# Patient Record
Sex: MALE | Race: BLACK OR AFRICAN AMERICAN | Employment: FULL TIME | URBAN - METROPOLITAN AREA
[De-identification: names, ages, dates, MRNs, and addresses within clinical notes are randomized per-mention and may not be internally consistent; named-entity substitution may affect disease eponyms.]

---

## 2018-04-09 ENCOUNTER — OFFICE VISIT (OUTPATIENT)
Dept: FAMILY MEDICINE CLINIC | Age: 53
End: 2018-04-09

## 2018-04-09 ENCOUNTER — HOSPITAL ENCOUNTER (OUTPATIENT)
Dept: LAB | Age: 53
Discharge: HOME | End: 2018-04-09
Payer: COMMERCIAL

## 2018-04-09 VITALS
OXYGEN SATURATION: 98 % | SYSTOLIC BLOOD PRESSURE: 142 MMHG | BODY MASS INDEX: 34.8 KG/M2 | TEMPERATURE: 97.4 F | WEIGHT: 235 LBS | DIASTOLIC BLOOD PRESSURE: 102 MMHG | HEIGHT: 69 IN | RESPIRATION RATE: 16 BRPM | HEART RATE: 58 BPM

## 2018-04-09 DIAGNOSIS — Z00.00 ROUTINE GENERAL MEDICAL EXAMINATION AT A HEALTH CARE FACILITY: ICD-10-CM

## 2018-04-09 DIAGNOSIS — Z00.00 ROUTINE GENERAL MEDICAL EXAMINATION AT A HEALTH CARE FACILITY: Primary | ICD-10-CM

## 2018-04-09 DIAGNOSIS — E55.9 VITAMIN D DEFICIENCY: ICD-10-CM

## 2018-04-09 DIAGNOSIS — I10 ESSENTIAL HYPERTENSION: ICD-10-CM

## 2018-04-09 DIAGNOSIS — E66.9 OBESITY (BMI 30.0-34.9): ICD-10-CM

## 2018-04-09 DIAGNOSIS — Z13.31 SCREENING FOR DEPRESSION: ICD-10-CM

## 2018-04-09 LAB
ALBUMIN SERPL-MCNC: 4.1 G/DL (ref 3.4–5)
ALBUMIN/GLOB SERPL: 1 {RATIO} (ref 0.8–1.7)
ALP SERPL-CCNC: 92 U/L (ref 45–117)
ALT SERPL-CCNC: 34 U/L (ref 16–61)
ANION GAP SERPL CALC-SCNC: 8 MMOL/L (ref 3–18)
AST SERPL-CCNC: 29 U/L (ref 15–37)
BILIRUB SERPL-MCNC: 1.1 MG/DL (ref 0.2–1)
BILIRUB UR QL STRIP: NEGATIVE
BUN SERPL-MCNC: 13 MG/DL (ref 7–18)
BUN/CREAT SERPL: 8 (ref 12–20)
CALCIUM SERPL-MCNC: 10 MG/DL (ref 8.5–10.1)
CHLORIDE SERPL-SCNC: 107 MMOL/L (ref 100–108)
CHOLEST SERPL-MCNC: 184 MG/DL
CO2 SERPL-SCNC: 31 MMOL/L (ref 21–32)
CREAT SERPL-MCNC: 1.64 MG/DL (ref 0.6–1.3)
GLOBULIN SER CALC-MCNC: 4.2 G/DL (ref 2–4)
GLUCOSE SERPL-MCNC: 122 MG/DL (ref 74–99)
GLUCOSE UR-MCNC: NEGATIVE MG/DL
HDLC SERPL-MCNC: 73 MG/DL (ref 40–60)
HDLC SERPL: 2.5 {RATIO} (ref 0–5)
KETONES P FAST UR STRIP-MCNC: NEGATIVE MG/DL
LDLC SERPL CALC-MCNC: 96 MG/DL (ref 0–100)
LIPID PROFILE,FLP: ABNORMAL
PH UR STRIP: 7 [PH] (ref 4.6–8)
POTASSIUM SERPL-SCNC: 5 MMOL/L (ref 3.5–5.5)
PROT SERPL-MCNC: 8.3 G/DL (ref 6.4–8.2)
PROT UR QL STRIP: NEGATIVE
PSA SERPL-MCNC: 1.2 NG/ML (ref 0–4)
SODIUM SERPL-SCNC: 146 MMOL/L (ref 136–145)
SP GR UR STRIP: 1.02 (ref 1–1.03)
TRIGL SERPL-MCNC: 75 MG/DL (ref ?–150)
TSH SERPL DL<=0.05 MIU/L-ACNC: 1.02 UIU/ML (ref 0.36–3.74)
UA UROBILINOGEN AMB POC: NORMAL (ref 0.2–1)
URINALYSIS CLARITY POC: CLEAR
URINALYSIS COLOR POC: YELLOW
URINE BLOOD POC: NEGATIVE
URINE LEUKOCYTES POC: NEGATIVE
URINE NITRITES POC: NEGATIVE
VLDLC SERPL CALC-MCNC: 15 MG/DL

## 2018-04-09 PROCEDURE — 82306 VITAMIN D 25 HYDROXY: CPT | Performed by: FAMILY MEDICINE

## 2018-04-09 PROCEDURE — 84153 ASSAY OF PSA TOTAL: CPT | Performed by: FAMILY MEDICINE

## 2018-04-09 PROCEDURE — 80061 LIPID PANEL: CPT | Performed by: FAMILY MEDICINE

## 2018-04-09 PROCEDURE — 84443 ASSAY THYROID STIM HORMONE: CPT | Performed by: FAMILY MEDICINE

## 2018-04-09 PROCEDURE — 80053 COMPREHEN METABOLIC PANEL: CPT | Performed by: FAMILY MEDICINE

## 2018-04-09 NOTE — PATIENT INSTRUCTIONS
Follow up on lab results in 1-2 days. If you sign up for \"My Chart\" you will be able to see the results online, and if you have any questions you can send me an e-mail. Increase exercise outside of work.     Recheck will depend on labs,

## 2018-04-09 NOTE — PROGRESS NOTES
HISTORY OF PRESENT ILLNESS  Franklin Colby is a 45 y.o. male. HPI Comments: Mr. Pato Peoples is here for an employee wellness PE. He has a history of htn, takes Valsartan, and his BP has been well-controlled. He just passed a DOT PE. He doesn't smoke or drink alcohol. 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 He doesn't do organized exercise anymore. Physical   Pertinent negatives include no chest pain, no abdominal pain, no headaches and no shortness of breath. Review of Systems   Constitutional: Negative for chills and fever. HENT: Negative for congestion, ear pain and sore throat. Eyes: Negative for discharge and redness. Respiratory: Negative for cough and shortness of breath. Cardiovascular: Negative for chest pain, palpitations and orthopnea. Gastrointestinal: Negative for abdominal pain, nausea and vomiting. Genitourinary: Negative for frequency and urgency. Skin: Negative for rash. Neurological: Negative for weakness and headaches. Endo/Heme/Allergies: Does not bruise/bleed easily. Physical Exam   Constitutional: He is oriented to person, place, and time. He appears well-developed and well-nourished. No distress. HENT:   Head: Atraumatic. Eyes: Pupils are equal, round, and reactive to light. Neck: Neck supple. No thyromegaly present. Cardiovascular: Normal rate and regular rhythm. Pulmonary/Chest: Effort normal and breath sounds normal. No respiratory distress. He has no wheezes. Abdominal: Soft. Bowel sounds are normal. He exhibits no distension. There is no tenderness. There is no rebound and no guarding. Musculoskeletal: Normal range of motion. He exhibits no edema. Lymphadenopathy:     He has no cervical adenopathy. Neurological: He is alert and oriented to person, place, and time. He exhibits normal muscle tone. Coordination normal.   Skin: No rash noted. Psychiatric: He has a normal mood and affect. Nursing note and vitals reviewed. ASSESSMENT and PLAN    ICD-10-CM ICD-9-CM    1. Routine general medical examination at a health care facility C80.36 Q28.3 METABOLIC PANEL, COMPREHENSIVE      AMB POC URINALYSIS DIP STICK AUTO W/O MICRO      LIPID PANEL      TSH 3RD GENERATION      PSA, DIAGNOSTIC (PROSTATE SPECIFIC AG)      VITAMIN D, 25 HYDROXY   2. Essential hypertension I10 401.9    3. Obesity (BMI 30.0-34. 9) E66.9 278.00    4.  Screening for depression Z13.89 V79.0      AVS instructions reviewed with patient, pt verbalized understanding

## 2018-04-09 NOTE — MR AVS SNAPSHOT
[address] [address] 
[phone] Patient: Garfield Jimenez MRN: [id number] :1965 Visit Information Date & Time Provider Department Dept. Phone Encounter #  
 2018 10:00 AM Karel Gaitan, [address] [facility] [phone] [id number] Follow-up Instructions Return if symptoms worsen or fail to improve. Upcoming Health Maintenance Date Due Hepatitis C Screening 1965 DTaP/Tdap/Td series (1 - Tdap) 1984 FOBT Q 1 YEAR AGE 50-75 2013 Influenza Age 5 to Adult 2017 Allergies as of 2018  Review Complete On: 2018 By: Karel Gaitan MD  
 No Known Allergies Current Immunizations  Never Reviewed No immunizations on file. Not reviewed this visit You Were Diagnosed With   
  
 Codes Comments Routine general medical examination at a health care facility    -  Primary ICD-10-CM: Z00.00 ICD-9-CM: V70.0 Obesity (BMI 30.0-34.9)     ICD-10-CM: D74.3 ICD-9-CM: 278.00 Vitals BP Pulse Temp Resp Height(growth percentile) Weight(growth percentile) (!) 151/108 (BP 1 Location: Left arm, BP Patient Position: Sitting) (!) 58 97.4 °F (36.3 °C) (Oral) 16 5' 9\" (1.753 m) 235 lb (106.6 kg) SpO2 BMI Smoking Status 98% 34.7 kg/m2 Never Smoker Vitals History BMI and BSA Data Body Mass Index Body Surface Area 34.7 kg/m 2 2.28 m 2 Preferred Pharmacy Pharmacy Name Phone [facility] [facility], [address] [place] [address] [phone] Your Updated Medication List  
  
   
This list is accurate as of 18 10:38 AM.  Always use your most recent med list.  
  
  
  
  
 MICARDIS HCT 80-25 mg per tablet Generic drug:  telmisartan-hydroCHLOROthiazide Take 1 Tab by mouth daily. We Performed the Following AMB POC URINALYSIS DIP STICK AUTO W/O MICRO [47537 CPT(R)] Follow-up Instructions Return if symptoms worsen or fail to improve. To-Do List   
 04/09/2018 Lab:  LIPID PANEL   
  
 04/09/2018 Lab:  METABOLIC PANEL, COMPREHENSIVE   
  
 04/09/2018 Lab:  PSA, DIAGNOSTIC (PROSTATE SPECIFIC AG) 04/09/2018 Lab:  TSH 3RD GENERATION   
  
 04/09/2018 Lab:  VITAMIN D, 25 HYDROXY Patient Instructions Follow up on lab results in 1-2 days. If you sign up for \"My Chart\" you will be able to see the results online, and if you have any questions you can send me an e-mail. Increase exercise outside of work. Recheck will depend on labs, Introducing [facility]! [employer] introduces [facility] patient portal. Now you can access parts of your medical record, email your doctor's office, and request medication refills online. 1. In your internet browser, go to [email or web address] 2. Click on the First Time User? Click Here link in the Sign In box. You will see the New Member Sign Up page. 3. Enter your [facility] Access Code exactly as it appears below. You will not need to use this code after youve completed the sign-up process. If you do not sign up before the expiration date, you must request a new code. · [facility] Access Code: [email or web address] Expires: 7/8/2018 10:26 AM 
 
4. Enter the last four digits of your Social Security Number (xxxx) and Date of Birth (mm/dd/yyyy) as indicated and click Submit. You will be taken to the next sign-up page. 5. Create a [facility] ID. This will be your [facility] login ID and cannot be changed, so think of one that is secure and easy to remember. 6. Create a [facility] password. You can change your password at any time. 7. Enter your Password Reset Question and Answer. This can be used at a later time if you forget your password. 8. Enter your e-mail address.  You will receive e-mail notification when new information is available in [facility]. 9. Click Sign Up. You can now view and download portions of your medical record. 10. Click the Download Summary menu link to download a portable copy of your medical information. If you have questions, please visit the Frequently Asked Questions section of the [facility] website. Remember, [facility] is NOT to be used for urgent needs. For medical emergencies, dial 911. Now available from your iPhone and Android! Please provide this summary of care documentation to your next provider. Your primary care clinician is listed as [facility]. If you have any questions after today's visit, please call [phone].

## 2018-04-09 NOTE — PROGRESS NOTES
Rm 3  Pt presents to the clinic for a physical. Pt forgot the insurance form at home but will bring it up here later today. Depression Screening:  PHQ over the last two weeks 4/9/2018   Little interest or pleasure in doing things Not at all   Feeling down, depressed or hopeless Not at all   Total Score PHQ 2 0       Learning Assessment:  Learning Assessment 4/9/2018   PRIMARY LEARNER Patient   HIGHEST LEVEL OF EDUCATION - PRIMARY LEARNER  SOME COLLEGE   BARRIERS PRIMARY LEARNER NONE   CO-LEARNER CAREGIVER No   PRIMARY LANGUAGE ENGLISH   LEARNER PREFERENCE PRIMARY DEMONSTRATION   ANSWERED BY Patient   RELATIONSHIP SELF       Abuse Screening:  No flowsheet data found. Health Maintenance reviewed and discussed per provider: yes     Coordination of Care:    1. Have you been to the ER, urgent care clinic since your last visit? Hospitalized since your last visit? no    2. Have you seen or consulted any other health care providers outside of the [address] [place] [address] since your last visit? Include any pap smears or colon screening.  no

## 2018-04-10 LAB — 25(OH)D3 SERPL-MCNC: 9.9 NG/ML (ref 30–100)

## 2018-04-10 NOTE — PROGRESS NOTES
Advise pt that his Vitamin D level was very low, I'll send in a Rx for 20 weeks. His kidney function tests are mildly abnormal, he needs these repeated, well-hydrated and he needs a HgB A1C done. Lipids and PSA were great.

## 2018-04-12 ENCOUNTER — OFFICE VISIT (OUTPATIENT)
Dept: FAMILY MEDICINE CLINIC | Age: 53
End: 2018-04-12

## 2018-04-12 ENCOUNTER — LAB ONLY (OUTPATIENT)
Dept: FAMILY MEDICINE CLINIC | Age: 53
End: 2018-04-12

## 2018-04-12 VITALS
HEART RATE: 54 BPM | WEIGHT: 228 LBS | HEIGHT: 69 IN | BODY MASS INDEX: 33.77 KG/M2 | TEMPERATURE: 97.1 F | RESPIRATION RATE: 16 BRPM | DIASTOLIC BLOOD PRESSURE: 97 MMHG | SYSTOLIC BLOOD PRESSURE: 156 MMHG | OXYGEN SATURATION: 98 %

## 2018-04-12 DIAGNOSIS — N28.9 ABNORMAL KIDNEY FUNCTION: ICD-10-CM

## 2018-04-12 DIAGNOSIS — E66.9 OBESITY (BMI 30.0-34.9): ICD-10-CM

## 2018-04-12 DIAGNOSIS — R73.03 PREDIABETES: Primary | ICD-10-CM

## 2018-04-12 DIAGNOSIS — I10 ESSENTIAL HYPERTENSION: ICD-10-CM

## 2018-04-12 LAB — HBA1C MFR BLD HPLC: 6.1 %

## 2018-04-12 NOTE — PROGRESS NOTES
Pt was called on 4/10/18. Both name and  verified. Pt was advised of lab results. Pt verbalized understanding and is in agreement with treatment plan.  Pt was also seen in office today for repeat labs

## 2018-04-12 NOTE — PROGRESS NOTES
HISTORY OF PRESENT ILLNESS  Preeti Cardoza is a 45 y.o. male. HPI Comments: Mr. Marly Wilhelm returns today to follow up on a FBS of 122 on his CPE, along with mildly elevated kidney functions tests. He has no history of diabetes, doesn't really watch what he eats. He doesn't do any organized exercise outside of work    Labs   Pertinent negatives include no chest pain, no abdominal pain, no headaches and no shortness of breath. Review of Systems   Constitutional: Negative for chills and fever. Eyes: Negative for blurred vision and double vision. Respiratory: Negative for cough and shortness of breath. Cardiovascular: Negative for chest pain and palpitations. Gastrointestinal: Negative for abdominal pain, nausea and vomiting. Neurological: Negative for headaches. Physical Exam   Constitutional: He is oriented to person, place, and time. He appears well-developed and well-nourished. Eyes: Pupils are equal, round, and reactive to light. Neck: Neck supple. Cardiovascular: Normal rate and regular rhythm. Pulmonary/Chest: Effort normal and breath sounds normal. No respiratory distress. He has no wheezes. He has no rales. Abdominal: Soft. He exhibits no distension. Lymphadenopathy:     He has no cervical adenopathy. Neurological: He is alert and oriented to person, place, and time. Psychiatric: He has a normal mood and affect. His behavior is normal.   Nursing note and vitals reviewed. Results for orders placed or performed in visit on 04/12/18   AMB POC HEMOGLOBIN A1C   Result Value Ref Range    Hemoglobin A1c (POC) 6.1 %       ASSESSMENT and PLAN    ICD-10-CM ICD-9-CM    1. Prediabetes R73.03 790.29 AMB POC HEMOGLOBIN L6R      METABOLIC PANEL, BASIC      METABOLIC PANEL, BASIC      REFERRAL TO NUTRITION      CANCELED: METABOLIC PANEL, BASIC   2.  Abnormal kidney function N28.9 593.9 AMB POC HEMOGLOBIN H9E      METABOLIC PANEL, BASIC      METABOLIC PANEL, BASIC      REFERRAL TO NUTRITION CANCELED: METABOLIC PANEL, BASIC   3. Obesity (BMI 30.0-34. 9) E66.9 278.00 AMB POC HEMOGLOBIN M1J      METABOLIC PANEL, BASIC      METABOLIC PANEL, BASIC      REFERRAL TO NUTRITION   4. Essential hypertension I10 401.9 AMB POC HEMOGLOBIN G8A      METABOLIC PANEL, BASIC      METABOLIC PANEL, BASIC      REFERRAL TO NUTRITION       Had a nice talk with Mr. Kalyan Mondragon. He is very interested to learn about what he can do (he said his previous doctor never went over lab results, just told him to not eat starchy food). He needs coaching on nutrition, including the fact that he must eat breakfast daily.  He would like very much to see a nutritionist.    AVS instructions reviewed with patient, pt verbalized understanding

## 2018-04-12 NOTE — PROGRESS NOTES
Rm 1  Pt presents to the clinic for a follow-up regarding his labs. Depression Screening:  PHQ over the last two weeks 4/12/2018 4/9/2018   Little interest or pleasure in doing things Not at all Not at all   Feeling down, depressed or hopeless Not at all Not at all   Total Score PHQ 2 0 0       Learning Assessment:  Learning Assessment 4/9/2018   PRIMARY LEARNER Patient   HIGHEST LEVEL OF EDUCATION - PRIMARY LEARNER  SOME COLLEGE   BARRIERS PRIMARY LEARNER NONE   CO-LEARNER CAREGIVER No   PRIMARY LANGUAGE ENGLISH   LEARNER PREFERENCE PRIMARY DEMONSTRATION   ANSWERED BY Patient   RELATIONSHIP SELF       Abuse Screening:  No flowsheet data found. Health Maintenance reviewed and discussed per provider: yes     Coordination of Care:    1. Have you been to the ER, urgent care clinic since your last visit? Hospitalized since your last visit? no    2. Have you seen or consulted any other health care providers outside of the [address] [place] [address] since your last visit? Include any pap smears or colon screening.  no

## 2018-04-12 NOTE — PATIENT INSTRUCTIONS
Your lab result came back as \"prediabetes\", see below. If you're not careful this will turn into diabetes. Start eating healthier, lower carbs, less sugar and no sodas. Return tomorrow, or whenever, well-hydrated and not fasting to repeat kidney tests. Prediabetes: Care Instructions  Your Care Instructions    Prediabetes is a warning sign that you are at risk for getting type 2 diabetes. It means that your blood sugar is higher than it should be. The food you eat turns into sugar, which your body uses for energy. Normally, an organ called the pancreas makes insulin, which allows the sugar in your blood to get into your body's cells. But when your body can't use insulin the right way, the sugar doesn't move into cells. It stays in your blood instead. This is called insulin resistance. The buildup of sugar in the blood causes prediabetes. The good news is that lifestyle changes may help you get your blood sugar back to normal and help you avoid or delay diabetes. Follow-up care is a key part of your treatment and safety. Be sure to make and go to all appointments, and call your doctor if you are having problems. It's also a good idea to know your test results and keep a list of the medicines you take. How can you care for yourself at home? · Watch your weight. A healthy weight helps your body use insulin properly. · Limit the amount of calories, sweets, and unhealthy fat you eat. Ask your doctor if you should see a dietitian. A registered dietitian can help you create meal plans that fit your lifestyle. · Get at least 30 minutes of exercise on most days of the week. Exercise helps control your blood sugar. It also helps you maintain a healthy weight. Walking is a good choice. You also may want to do other activities, such as running, swimming, cycling, or playing tennis or team sports. · Do not smoke. Smoking can make prediabetes worse.  If you need help quitting, talk to your doctor about stop-smoking programs and medicines. These can increase your chances of quitting for good. · If your doctor prescribed medicines, take them exactly as prescribed. Call your doctor if you think you are having a problem with your medicine. You will get more details on the specific medicines your doctor prescribes. When should you call for help? Watch closely for changes in your health, and be sure to contact your doctor if:  ? · You have any symptoms of diabetes. These may include:  ¨ Being thirsty more often. ¨ Urinating more. ¨ Being hungrier. ¨ Losing weight. ¨ Being very tired. ¨ Having blurry vision. ? · You have a wound that will not heal.   ? · You have an infection that will not go away. ? · You have problems with your blood pressure. ? · You want more information about diabetes and how you can keep from getting it. Where can you learn more? Go to http://debbieeWave Interactiveabdiel.info/. Enter I222 in the search box to learn more about \"Prediabetes: Care Instructions. \"  Current as of: March 13, 2017  Content Version: 11.4  © 3556-2952 [employer]. Care instructions adapted under license by [employer] (which disclaims liability or warranty for this information). If you have questions about a medical condition or this instruction, always ask your healthcare professional. [address] any warranty or liability for your use of this information. Learning About Diabetes Food Guidelines  Your Care Instructions    Meal planning is important to manage diabetes. It helps keep your blood sugar at a target level (which you set with your doctor). You don't have to eat special foods. You can eat what your family eats, including sweets once in a while. But you do have to pay attention to how often you eat and how much you eat of certain foods. You may want to work with a dietitian or a certified diabetes educator (CDE) to help you plan meals and snacks.  A dietitian or CDE can also help you lose weight if that is one of your goals. What should you know about eating carbs? Managing the amount of carbohydrate (carbs) you eat is an important part of healthy meals when you have diabetes. Carbohydrate is found in many foods. · Learn which foods have carbs. And learn the amounts of carbs in different foods. ¨ Bread, cereal, pasta, and rice have about 15 grams of carbs in a serving. A serving is 1 slice of bread (1 ounce), ½ cup of cooked cereal, or 1/3 cup of cooked pasta or rice. ¨ Fruits have 15 grams of carbs in a serving. A serving is 1 small fresh fruit, such as an apple or orange; ½ of a banana; ½ cup of cooked or canned fruit; ½ cup of fruit juice; 1 cup of melon or raspberries; or 2 tablespoons of dried fruit. ¨ Milk and no-sugar-added yogurt have 15 grams of carbs in a serving. A serving is 1 cup of milk or 2/3 cup of no-sugar-added yogurt. ¨ Starchy vegetables have 15 grams of carbs in a serving. A serving is ½ cup of mashed potatoes or sweet potato; 1 cup winter squash; ½ of a small baked potato; ½ cup of cooked beans; or ½ cup cooked corn or green peas. · Learn how much carbs to eat each day and at each meal. A dietitian or CDE can teach you how to keep track of the amount of carbs you eat. This is called carbohydrate counting. · If you are not sure how to count carbohydrate grams, use the Plate Method to plan meals. It is a good, quick way to make sure that you have a balanced meal. It also helps you spread carbs throughout the day. ¨ Divide your plate by types of foods. Put non-starchy vegetables on half the plate, meat or other protein food on one-quarter of the plate, and a grain or starchy vegetable in the final quarter of the plate.  To this you can add a small piece of fruit and 1 cup of milk or yogurt, depending on how many carbs you are supposed to eat at a meal.  · Try to eat about the same amount of carbs at each meal. Do not \"save up\" your daily allowance of carbs to eat at one meal.  · Proteins have very little or no carbs per serving. Examples of proteins are beef, chicken, turkey, fish, eggs, tofu, cheese, cottage cheese, and peanut butter. A serving size of meat is 3 ounces, which is about the size of a deck of cards. Examples of meat substitute serving sizes (equal to 1 ounce of meat) are 1/4 cup of cottage cheese, 1 egg, 1 tablespoon of peanut butter, and ½ cup of tofu. How can you eat out and still eat healthy? · Learn to estimate the serving sizes of foods that have carbohydrate. If you measure food at home, it will be easier to estimate the amount in a serving of restaurant food. · If the meal you order has too much carbohydrate (such as potatoes, corn, or baked beans), ask to have a low-carbohydrate food instead. Ask for a salad or green vegetables. · If you use insulin, check your blood sugar before and after eating out to help you plan how much to eat in the future. · If you eat more carbohydrate at a meal than you had planned, take a walk or do other exercise. This will help lower your blood sugar. What else should you know? · Limit saturated fat, such as the fat from meat and dairy products. This is a healthy choice because people who have diabetes are at higher risk of heart disease. So choose lean cuts of meat and nonfat or low-fat dairy products. Use olive or canola oil instead of butter or shortening when cooking. · Don't skip meals. Your blood sugar may drop too low if you skip meals and take insulin or certain medicines for diabetes. · Check with your doctor before you drink alcohol. Alcohol can cause your blood sugar to drop too low. Alcohol can also cause a bad reaction if you take certain diabetes medicines. Follow-up care is a key part of your treatment and safety. Be sure to make and go to all appointments, and call your doctor if you are having problems.  It's also a good idea to know your test results and keep a list of the medicines you take. Where can you learn more? Go to http://debbie-abdiel.info/. Enter E729 in the search box to learn more about \"Learning About Diabetes Food Guidelines. \"  Current as of: March 13, 2017  Content Version: 11.4  © 0599-5266 [employer]. Care instructions adapted under license by [employer] (which disclaims liability or warranty for this information). If you have questions about a medical condition or this instruction, always ask your healthcare professional. [place] [address] any warranty or liability for your use of this information.

## 2018-04-12 NOTE — MR AVS SNAPSHOT
Odilon Tee 
 
 
 [facility] [address] [facility] [address] 
[phone] Patient: Cynthia Hobbs MRN: [id number] :1965 Visit Information Date & Time Provider Department Dept. Phone Encounter #  
 2018 11:00 AM Debborah Klinefelter, 233 [facility] [phone] [id number] Follow-up Instructions Return if symptoms worsen or fail to improve. Your Appointments 2018 11:00 AM  
Office Visit with Debborah Klinefelter, MD  
233 [facility]) Appt Note: A1C recheck Fernandez Treviño [facility] [address]  
[phone]  
  
   
 Bartolo Gregorio [address]. [id number] Upcoming Health Maintenance Date Due Hepatitis C Screening 1965 DTaP/Tdap/Td series (1 - Tdap) 1984 FOBT Q 1 YEAR AGE 50-75 2013 Influenza Age 5 to Adult 2017 Allergies as of 2018  Review Complete On: 2018 By: Denise Hartley LPN No Known Allergies Current Immunizations  Never Reviewed No immunizations on file. Not reviewed this visit You Were Diagnosed With   
  
 Codes Comments Prediabetes    -  Primary ICD-10-CM: R73.03 
ICD-9-CM: 790.29 Abnormal kidney function     ICD-10-CM: N28.9 ICD-9-CM: 593.9 Vitals BP Pulse Temp Resp Height(growth percentile) Weight(growth percentile) (!) 156/97 (BP 1 Location: Right arm, BP Patient Position: Sitting) (!) 54 97.1 °F (36.2 °C) (Oral) 16 5' 9\" (1.753 m) 228 lb (103.4 kg) SpO2 BMI Smoking Status 98% 33.67 kg/m2 Never Smoker Vitals History BMI and BSA Data Body Mass Index Body Surface Area  
 33.67 kg/m 2 2.24 m 2 Preferred Pharmacy Pharmacy Name Phone [facility] [facility], [address] [place] [address] [phone] Your Updated Medication List  
  
   
 This list is accurate as of 4/12/18 10:52 AM.  Always use your most recent med list.  
  
  
  
  
 cholecalciferol (vitamin D3) 50,000 unit Tab Take 1 Tab by mouth every seven (7) days. MICARDIS HCT 80-25 mg per tablet Generic drug:  telmisartan-hydroCHLOROthiazide Take 1 Tab by mouth daily. We Performed the Following AMB POC HEMOGLOBIN A1C [33162 CPT(R)] Follow-up Instructions Return if symptoms worsen or fail to improve. To-Do List   
 04/13/2018 Lab:  METABOLIC PANEL, BASIC Patient Instructions Your lab result came back as \"prediabetes\", see below. If you're not careful this will turn into diabetes. Start eating healthier, lower carbs, less sugar and no sodas. Return tomorrow, or whenever, well-hydrated and not fasting to repeat kidney tests. Prediabetes: Care Instructions Your Care Instructions Prediabetes is a warning sign that you are at risk for getting type 2 diabetes. It means that your blood sugar is higher than it should be. The food you eat turns into sugar, which your body uses for energy. Normally, an organ called the pancreas makes insulin, which allows the sugar in your blood to get into your body's cells. But when your body can't use insulin the right way, the sugar doesn't move into cells. It stays in your blood instead. This is called insulin resistance. The buildup of sugar in the blood causes prediabetes. The good news is that lifestyle changes may help you get your blood sugar back to normal and help you avoid or delay diabetes. Follow-up care is a key part of your treatment and safety. Be sure to make and go to all appointments, and call your doctor if you are having problems. It's also a good idea to know your test results and keep a list of the medicines you take. How can you care for yourself at home? · Watch your weight. A healthy weight helps your body use insulin properly. · Limit the amount of calories, sweets, and unhealthy fat you eat. Ask your doctor if you should see a dietitian. A registered dietitian can help you create meal plans that fit your lifestyle. · Get at least 30 minutes of exercise on most days of the week. Exercise helps control your blood sugar. It also helps you maintain a healthy weight. Walking is a good choice. You also may want to do other activities, such as running, swimming, cycling, or playing tennis or team sports. · Do not smoke. Smoking can make prediabetes worse. If you need help quitting, talk to your doctor about stop-smoking programs and medicines. These can increase your chances of quitting for good. · If your doctor prescribed medicines, take them exactly as prescribed. Call your doctor if you think you are having a problem with your medicine. You will get more details on the specific medicines your doctor prescribes. When should you call for help? Watch closely for changes in your health, and be sure to contact your doctor if: 
? · You have any symptoms of diabetes. These may include: ¨ Being thirsty more often. ¨ Urinating more. ¨ Being hungrier. ¨ Losing weight. ¨ Being very tired. ¨ Having blurry vision. ? · You have a wound that will not heal.  
? · You have an infection that will not go away. ? · You have problems with your blood pressure. ? · You want more information about diabetes and how you can keep from getting it. Where can you learn more? Go to http://debbie-abdiel.info/. Enter I222 in the search box to learn more about \"Prediabetes: Care Instructions. \" Current as of: March 13, 2017 Content Version: 11.4 © 9638-6904 Cell>Point. Care instructions adapted under license by [employer] (which disclaims liability or warranty for this information).  If you have questions about a medical condition or this instruction, always ask your healthcare professional. [employer] disclaims any warranty or liability for your use of this information. Learning About Diabetes Food Guidelines Your Care Instructions Meal planning is important to manage diabetes. It helps keep your blood sugar at a target level (which you set with your doctor). You don't have to eat special foods. You can eat what your family eats, including sweets once in a while. But you do have to pay attention to how often you eat and how much you eat of certain foods. You may want to work with a dietitian or a certified diabetes educator (CDE) to help you plan meals and snacks. A dietitian or CDE can also help you lose weight if that is one of your goals. What should you know about eating carbs? Managing the amount of carbohydrate (carbs) you eat is an important part of healthy meals when you have diabetes. Carbohydrate is found in many foods. · Learn which foods have carbs. And learn the amounts of carbs in different foods. ¨ Bread, cereal, pasta, and rice have about 15 grams of carbs in a serving. A serving is 1 slice of bread (1 ounce), ½ cup of cooked cereal, or 1/3 cup of cooked pasta or rice. ¨ Fruits have 15 grams of carbs in a serving. A serving is 1 small fresh fruit, such as an apple or orange; ½ of a banana; ½ cup of cooked or canned fruit; ½ cup of fruit juice; 1 cup of melon or raspberries; or 2 tablespoons of dried fruit. ¨ Milk and no-sugar-added yogurt have 15 grams of carbs in a serving. A serving is 1 cup of milk or 2/3 cup of no-sugar-added yogurt. ¨ Starchy vegetables have 15 grams of carbs in a serving. A serving is ½ cup of mashed potatoes or sweet potato; 1 cup winter squash; ½ of a small baked potato; ½ cup of cooked beans; or ½ cup cooked corn or green peas. · Learn how much carbs to eat each day and at each meal. A dietitian or CDE can teach you how to keep track of the amount of carbs you eat. This is called carbohydrate counting. · If you are not sure how to count carbohydrate grams, use the Plate Method to plan meals. It is a good, quick way to make sure that you have a balanced meal. It also helps you spread carbs throughout the day. ¨ Divide your plate by types of foods. Put non-starchy vegetables on half the plate, meat or other protein food on one-quarter of the plate, and a grain or starchy vegetable in the final quarter of the plate. To this you can add a small piece of fruit and 1 cup of milk or yogurt, depending on how many carbs you are supposed to eat at a meal. 
· Try to eat about the same amount of carbs at each meal. Do not \"save up\" your daily allowance of carbs to eat at one meal. 
· Proteins have very little or no carbs per serving. Examples of proteins are beef, chicken, turkey, fish, eggs, tofu, cheese, cottage cheese, and peanut butter. A serving size of meat is 3 ounces, which is about the size of a deck of cards. Examples of meat substitute serving sizes (equal to 1 ounce of meat) are 1/4 cup of cottage cheese, 1 egg, 1 tablespoon of peanut butter, and ½ cup of tofu. How can you eat out and still eat healthy? · Learn to estimate the serving sizes of foods that have carbohydrate. If you measure food at home, it will be easier to estimate the amount in a serving of restaurant food. · If the meal you order has too much carbohydrate (such as potatoes, corn, or baked beans), ask to have a low-carbohydrate food instead. Ask for a salad or green vegetables. · If you use insulin, check your blood sugar before and after eating out to help you plan how much to eat in the future. · If you eat more carbohydrate at a meal than you had planned, take a walk or do other exercise. This will help lower your blood sugar. What else should you know? · Limit saturated fat, such as the fat from meat and dairy products.  This is a healthy choice because people who have diabetes are at higher risk of heart disease. So choose lean cuts of meat and nonfat or low-fat dairy products. Use olive or canola oil instead of butter or shortening when cooking. · Don't skip meals. Your blood sugar may drop too low if you skip meals and take insulin or certain medicines for diabetes. · Check with your doctor before you drink alcohol. Alcohol can cause your blood sugar to drop too low. Alcohol can also cause a bad reaction if you take certain diabetes medicines. Follow-up care is a key part of your treatment and safety. Be sure to make and go to all appointments, and call your doctor if you are having problems. It's also a good idea to know your test results and keep a list of the medicines you take. Where can you learn more? Go to http://debbie-abdiel.info/. Enter L100 in the search box to learn more about \"Learning About Diabetes Food Guidelines. \" Current as of: March 13, 2017 Content Version: 11.4 © 4028-4951 Bestcake. Care instructions adapted under license by [employer] (which disclaims liability or warranty for this information). If you have questions about a medical condition or this instruction, always ask your healthcare professional. [place] [address] any warranty or liability for your use of this information. Introducing [facility]! [facility] introduces [employer] patient portal. Now you can access parts of your medical record, email your doctor's office, and request medication refills online. 1. In your internet browser, go to https://[employer]. [employer]/LBR/[employer] 2. Click on the First Time User? Click Here link in the Sign In box. You will see the New Member Sign Up page. 3. Enter your Sportmaniacs Access Code exactly as it appears below. You will not need to use this code after youve completed the sign-up process. If you do not sign up before the expiration date, you must request a new code. · [employer] Access Code: [email or web address] Expires: 7/8/2018 10:26 AM 
 
4. Enter the last four digits of your Social Security Number (xxxx) and Date of Birth (mm/dd/yyyy) as indicated and click Submit. You will be taken to the next sign-up page. 5. Create a [employer] ID. This will be your [employer] login ID and cannot be changed, so think of one that is secure and easy to remember. 6. Create a [employer] password. You can change your password at any time. 7. Enter your Password Reset Question and Answer. This can be used at a later time if you forget your password. 8. Enter your e-mail address. You will receive e-mail notification when new information is available in [address] 9. Click Sign Up. You can now view and download portions of your medical record. 10. Click the Download Summary menu link to download a portable copy of your medical information. If you have questions, please visit the Frequently Asked Questions section of the [employer] website. Remember, [employer] is NOT to be used for urgent needs. For medical emergencies, dial 911. Now available from your iPhone and Android! Please provide this summary of care documentation to your next provider. Your primary care clinician is listed as Jennifer Mcgee. If you have any questions after today's visit, please call [phone].

## 2018-05-04 RX ORDER — TELMISARTAN AND HYDROCHLORTHIAZIDE 80; 25 MG/1; MG/1
1 TABLET ORAL DAILY
Qty: 30 TAB | Refills: 3 | Status: SHIPPED | OUTPATIENT
Start: 2018-05-04 | End: 2018-08-28 | Stop reason: SDUPTHER

## 2018-05-04 NOTE — TELEPHONE ENCOUNTER
Requested Prescriptions     Pending Prescriptions Disp Refills    telmisartan-hydroCHLOROthiazide (MICARDIS HCT) 80-25 mg per tablet       Sig: Take 1 Tab by mouth daily.

## 2018-08-28 RX ORDER — TELMISARTAN AND HYDROCHLORTHIAZIDE 80; 25 MG/1; MG/1
1 TABLET ORAL DAILY
Qty: 30 TAB | Refills: 3 | Status: SHIPPED | OUTPATIENT
Start: 2018-08-28 | End: 2018-12-30 | Stop reason: SDUPTHER

## 2018-08-28 NOTE — TELEPHONE ENCOUNTER
Requested Prescriptions     Pending Prescriptions Disp Refills    telmisartan-hydroCHLOROthiazide (MICARDIS HCT) 80-25 mg per tablet 30 Tab 3     Sig: Take 1 Tab by mouth daily.

## 2018-12-31 RX ORDER — TELMISARTAN AND HYDROCHLORTHIAZIDE 80; 25 MG/1; MG/1
TABLET ORAL
Qty: 30 TAB | Refills: 0 | Status: SHIPPED | OUTPATIENT
Start: 2018-12-31 | End: 2019-02-05 | Stop reason: SDUPTHER

## 2019-02-05 RX ORDER — TELMISARTAN AND HYDROCHLORTHIAZIDE 80; 25 MG/1; MG/1
TABLET ORAL
Qty: 30 TAB | Refills: 0 | Status: SHIPPED | OUTPATIENT
Start: 2019-02-05 | End: 2019-02-11 | Stop reason: SDUPTHER

## 2019-02-11 ENCOUNTER — HOSPITAL ENCOUNTER (OUTPATIENT)
Dept: LAB | Age: 54
Discharge: HOME | End: 2019-02-11
Payer: COMMERCIAL

## 2019-02-11 ENCOUNTER — OFFICE VISIT (OUTPATIENT)
Dept: FAMILY MEDICINE CLINIC | Age: 54
End: 2019-02-11

## 2019-02-11 VITALS
SYSTOLIC BLOOD PRESSURE: 160 MMHG | RESPIRATION RATE: 18 BRPM | HEART RATE: 64 BPM | TEMPERATURE: 98.2 F | OXYGEN SATURATION: 97 % | BODY MASS INDEX: 37.33 KG/M2 | HEIGHT: 69 IN | WEIGHT: 252 LBS | DIASTOLIC BLOOD PRESSURE: 95 MMHG

## 2019-02-11 DIAGNOSIS — R73.03 PREDIABETES: ICD-10-CM

## 2019-02-11 DIAGNOSIS — I10 ESSENTIAL HYPERTENSION: Primary | ICD-10-CM

## 2019-02-11 DIAGNOSIS — Z12.11 SCREENING FOR COLON CANCER: ICD-10-CM

## 2019-02-11 DIAGNOSIS — E55.9 VITAMIN D DEFICIENCY: ICD-10-CM

## 2019-02-11 DIAGNOSIS — I10 ESSENTIAL HYPERTENSION: ICD-10-CM

## 2019-02-11 DIAGNOSIS — N28.9 ABNORMAL KIDNEY FUNCTION: ICD-10-CM

## 2019-02-11 PROBLEM — E66.01 SEVERE OBESITY (HCC): Status: ACTIVE | Noted: 2019-02-11

## 2019-02-11 LAB
ANION GAP SERPL CALC-SCNC: 6 MMOL/L (ref 3–18)
BUN SERPL-MCNC: 16 MG/DL (ref 7–18)
BUN/CREAT SERPL: 11 (ref 12–20)
CALCIUM SERPL-MCNC: 9.6 MG/DL (ref 8.5–10.1)
CHLORIDE SERPL-SCNC: 104 MMOL/L (ref 100–108)
CO2 SERPL-SCNC: 32 MMOL/L (ref 21–32)
CREAT SERPL-MCNC: 1.43 MG/DL (ref 0.6–1.3)
GLUCOSE SERPL-MCNC: 114 MG/DL (ref 74–99)
POTASSIUM SERPL-SCNC: 4.7 MMOL/L (ref 3.5–5.5)
SODIUM SERPL-SCNC: 142 MMOL/L (ref 136–145)

## 2019-02-11 PROCEDURE — 82306 VITAMIN D 25 HYDROXY: CPT

## 2019-02-11 PROCEDURE — 80048 BASIC METABOLIC PNL TOTAL CA: CPT

## 2019-02-11 RX ORDER — TELMISARTAN AND HYDROCHLORTHIAZIDE 80; 25 MG/1; MG/1
TABLET ORAL
Qty: 30 TAB | Refills: 5 | Status: SHIPPED | OUTPATIENT
Start: 2019-02-11

## 2019-02-11 NOTE — PATIENT INSTRUCTIONS
Follow up on lab results in 1-2 days. If you sign up for \"My Chart\" you will be able to see the results online, and if you have any questions you can send me an e-mail. Continue the same medications for now, we may change things when the labs come back. Recheck 3 months. Monitor BP-find a pharmacy with a machine and check once or twice weekly. Record the readings and recheck if it isn't consistently below 140/90

## 2019-02-11 NOTE — PROGRESS NOTES
Rm:1 
 
Chief Complaint Patient presents with  Hypertension Depression Screening: PHQ over the last two weeks 2/11/2019 4/12/2018 4/9/2018 Little interest or pleasure in doing things Not at all Not at all Not at all Feeling down, depressed, irritable, or hopeless Not at all Not at all Not at all Total Score PHQ 2 0 0 0 Learning Assessment: 
Learning Assessment 4/9/2018 PRIMARY LEARNER Patient HIGHEST LEVEL OF EDUCATION - PRIMARY LEARNER  SOME COLLEGE  
BARRIERS PRIMARY LEARNER NONE  
CO-LEARNER CAREGIVER No  
PRIMARY LANGUAGE ENGLISH  
LEARNER PREFERENCE PRIMARY DEMONSTRATION  
ANSWERED BY Patient RELATIONSHIP SELF Abuse Screening: No flowsheet data found. Health Maintenance reviewed and discussed per provider: yes Coordination of Care: 1. Have you been to the ER, urgent care clinic since your last visit? Hospitalized since your last visit? no 
 
2. Have you seen or consulted any other health care providers outside of the [address] [place] [address] since your last visit? Include any pap smears or colon screening.  no

## 2019-02-12 LAB — 25(OH)D3 SERPL-MCNC: 23.8 NG/ML (ref 30–100)

## 2019-02-12 NOTE — PROGRESS NOTES
Advise patient that his kidney function tests are better, still need to keep an eye on them though. He should be taking a Vitamin D supplement daily, and his blood sugar was mildly elevated. We will check that again in 3 months.

## 2019-02-12 NOTE — PROGRESS NOTES
Call made to pt, both name and  verified. Pt was advised of previous message from Dr. Van Trevizo.  Pt verbalized understanding and had no further questions or concerns

## 2019-02-26 ENCOUNTER — OFFICE VISIT (OUTPATIENT)
Dept: FAMILY MEDICINE CLINIC | Age: 54
End: 2019-02-26

## 2019-02-26 VITALS
BODY MASS INDEX: 36.97 KG/M2 | HEART RATE: 119 BPM | OXYGEN SATURATION: 96 % | RESPIRATION RATE: 16 BRPM | DIASTOLIC BLOOD PRESSURE: 90 MMHG | WEIGHT: 249.6 LBS | SYSTOLIC BLOOD PRESSURE: 152 MMHG | TEMPERATURE: 100.9 F | HEIGHT: 69 IN

## 2019-02-26 DIAGNOSIS — R50.9 FEVER, UNSPECIFIED FEVER CAUSE: ICD-10-CM

## 2019-02-26 DIAGNOSIS — E66.01 SEVERE OBESITY (HCC): ICD-10-CM

## 2019-02-26 DIAGNOSIS — J09.X2 INFLUENZA A (H5N1): Primary | ICD-10-CM

## 2019-02-26 LAB
FLUAV+FLUBV AG NOSE QL IA.RAPID: NEGATIVE POS/NEG
FLUAV+FLUBV AG NOSE QL IA.RAPID: POSITIVE POS/NEG
VALID INTERNAL CONTROL?: YES

## 2019-02-26 RX ORDER — HYDROCODONE POLISTIREX AND CHLORPHENIRAMINE POLISTIREX 10; 8 MG/5ML; MG/5ML
1 SUSPENSION, EXTENDED RELEASE ORAL
Qty: 60 ML | Refills: 0 | Status: SHIPPED | OUTPATIENT
Start: 2019-02-26 | End: 2019-03-04

## 2019-02-26 RX ORDER — OSELTAMIVIR PHOSPHATE 75 MG/1
75 CAPSULE ORAL 2 TIMES DAILY
Qty: 10 CAP | Refills: 0 | Status: SHIPPED | OUTPATIENT
Start: 2019-02-26 | End: 2019-03-03

## 2019-02-26 NOTE — PROGRESS NOTES
HISTORY OF PRESENT ILLNESS Mamadou Mcleod is a 52 y.o. male. Mr. Nisha Reilly is here because of flu-like symptoms. He started with a cough yesterday and awoke today with a fever, body aches, headache. No GI symptoms. He did not get a flu shot this year. Review of Systems Constitutional: Positive for chills, fever and malaise/fatigue. Negative for weight loss. HENT: Negative for hearing loss. Eyes: Negative for blurred vision, double vision and photophobia. Respiratory: Positive for cough. Negative for sputum production and shortness of breath. Cardiovascular: Negative for chest pain, palpitations and orthopnea. Gastrointestinal: Negative for blood in stool, constipation, diarrhea and heartburn. Genitourinary: Negative for dysuria, frequency and urgency. Musculoskeletal: Positive for myalgias. Negative for back pain, joint pain and neck pain. Skin: Negative for rash. Neurological: Positive for headaches. Negative for dizziness and focal weakness. Physical Exam  
Constitutional: He appears well-developed and well-nourished. HENT:  
Right Ear: Tympanic membrane, external ear and ear canal normal.  
Left Ear: Tympanic membrane, external ear and ear canal normal.  
Nose: Nose normal.  
Mouth/Throat: Uvula is midline, oropharynx is clear and moist and mucous membranes are normal. No posterior oropharyngeal erythema. Eyes: Conjunctivae are normal. Pupils are equal, round, and reactive to light. Right conjunctiva is not injected. Left conjunctiva is not injected. Neck: Neck supple. No thyromegaly present. Cardiovascular: Normal rate and regular rhythm. Pulmonary/Chest: Effort normal and breath sounds normal. No respiratory distress. He has no wheezes. He has no rales. Abdominal: He exhibits no distension. There is no tenderness. Lymphadenopathy:  
  He has no cervical adenopathy. Skin: No rash noted. Nursing note and vitals reviewed. Results for orders placed or performed in visit on 02/26/19 AMB POC LONNIE INFLUENZA A/B TEST Result Value Ref Range VALID INTERNAL CONTROL POC Yes Influenza A Ag POC Positive Negative Pos/Neg Influenza B Ag POC Negative Negative Pos/Neg  
 
 
ASSESSMENT and PLAN 
  ICD-10-CM ICD-9-CM 1. Influenza A (H5N1) J09. X2 488.02 oseltamivir (TAMIFLU) 75 mg capsule  
   chlorpheniramine-HYDROcodone (TUSSIONEX) 10-8 mg/5 mL suspension 2. Fever, unspecified fever cause R50.9 780.60 AMB POC LONNIE INFLUENZA A/B TEST 3.  Severe obesity (HCC) E66.01 278.01

## 2019-02-26 NOTE — PATIENT INSTRUCTIONS
Take the tamiflu for 5 days, this will shorten the case of the flu. There is cough medication if you need it. Advil for fever and body aches. Recheck as needed Influenza (Flu): Care Instructions Your Care Instructions Influenza (flu) is an infection in the lungs and breathing passages. It is caused by the influenza virus. There are different strains, or types, of the flu virus from year to year. Unlike the common cold, the flu comes on suddenly and the symptoms, such as a cough, congestion, fever, chills, fatigue, aches, and pains, are more severe. These symptoms may last up to 10 days. Although the flu can make you feel very sick, it usually doesn't cause serious health problems. Home treatment is usually all you need for flu symptoms. But your doctor may prescribe antiviral medicine to prevent other health problems, such as pneumonia, from developing. Older people and those who have a long-term health condition, such as lung disease, are most at risk for having pneumonia or other health problems. Follow-up care is a key part of your treatment and safety. Be sure to make and go to all appointments, and call your doctor if you are having problems. It's also a good idea to know your test results and keep a list of the medicines you take. How can you care for yourself at home? · Get plenty of rest. 
· Drink plenty of fluids, enough so that your urine is light yellow or clear like water. If you have kidney, heart, or liver disease and have to limit fluids, talk with your doctor before you increase the amount of fluids you drink. · Take an over-the-counter pain medicine if needed, such as acetaminophen (Tylenol), ibuprofen (Advil, Motrin), or naproxen (Aleve), to relieve fever, headache, and muscle aches. Read and follow all instructions on the label. No one younger than 20 should take aspirin. It has been linked to Reye syndrome, a serious illness. · Do not smoke. Smoking can make the flu worse. If you need help quitting, talk to your doctor about stop-smoking programs and medicines. These can increase your chances of quitting for good. · Breathe moist air from a hot shower or from a sink filled with hot water to help clear a stuffy nose. · Before you use cough and cold medicines, check the label. These medicines may not be safe for young children or for people with certain health problems. · If the skin around your nose and lips becomes sore, put some petroleum jelly on the area. · To ease coughing: ? Drink fluids to soothe a scratchy throat. ? Suck on cough drops or plain hard candy. ? Take an over-the-counter cough medicine that contains dextromethorphan to help you get some sleep. Read and follow all instructions on the label. ? Raise your head at night with an extra pillow. This may help you rest if coughing keeps you awake. · Take any prescribed medicine exactly as directed. Call your doctor if you think you are having a problem with your medicine. To avoid spreading the flu · Wash your hands regularly, and keep your hands away from your face. · Stay home from school, work, and other public places until you are feeling better and your fever has been gone for at least 24 hours. The fever needs to have gone away on its own without the help of medicine. · Ask people living with you to talk to their doctors about preventing the flu. They may get antiviral medicine to keep from getting the flu from you. · To prevent the flu in the future, get a flu vaccine every fall. Encourage people living with you to get the vaccine. · Cover your mouth when you cough or sneeze. When should you call for help? Call 911 anytime you think you may need emergency care. For example, call if: 
  · You have severe trouble breathing.  
 Call your doctor now or seek immediate medical care if: 
  · You have new or worse trouble breathing.   · You seem to be getting much sicker.  
  · You feel very sleepy or confused.  
  · You have a new or higher fever.  
  · You get a new rash.  
 Watch closely for changes in your health, and be sure to contact your doctor if: 
  · You begin to get better and then get worse.  
  · You are not getting better after 1 week. Where can you learn more? Go to http://debbie-abdiel.info/. Enter S850 in the search box to learn more about \"Influenza (Flu): Care Instructions. \" Current as of: September 5, 2018 Content Version: 11.9 © 7784-4622 Pixelpipe. Care instructions adapted under license by [employer] (which disclaims liability or warranty for this information). If you have questions about a medical condition or this instruction, always ask your healthcare professional. [address] any warranty or liability for your use of this information.

## 2019-02-26 NOTE — PROGRESS NOTES
Rm 1 
Patient presents to the clinic c/o flu like symptoms of body aches and cough. Depression Screening: 
3 most recent [facility] Screens 2/26/2019 2/11/2019 4/12/2018 4/9/2018 Little interest or pleasure in doing things Not at all Not at all Not at all Not at all Feeling down, depressed, irritable, or hopeless Not at all Not at all Not at all Not at all Total Score PHQ 2 0 0 0 0 Learning Assessment: 
Learning Assessment 4/9/2018 PRIMARY LEARNER Patient HIGHEST LEVEL OF EDUCATION - PRIMARY LEARNER  SOME COLLEGE  
BARRIERS PRIMARY LEARNER NONE  
CO-LEARNER CAREGIVER No  
PRIMARY LANGUAGE ENGLISH  
LEARNER PREFERENCE PRIMARY DEMONSTRATION  
ANSWERED BY Patient RELATIONSHIP SELF Abuse Screening: No flowsheet data found. Health Maintenance reviewed and discussed per provider: yes Coordination of Care: 1. Have you been to the ER, urgent care clinic since your last visit? Hospitalized since your last visit? no 
 
2. Have you seen or consulted any other health care providers outside of the [address] [place] [address] since your last visit? Include any pap smears or colon screening.  No

## 2019-02-26 NOTE — LETTER
NOTIFICATION RETURN TO WORK  
 
2/26/2019 4:09 PM 
 
Mr. Nicki Harrison [address] [place] [address] To Whom It May Concern: 
 
Nicki Harrison is currently under the care of [facility]. He will return to work on: 3/4/19. Excuse him until then for medical reasons If there are questions or concerns please have the patient contact our office. Sincerely, Claudia Dominique MD

## 2020-09-06 ENCOUNTER — APPOINTMENT (OUTPATIENT)
Dept: GENERAL RADIOLOGY | Age: 55
End: 2020-09-06
Attending: EMERGENCY MEDICINE
Payer: COMMERCIAL

## 2020-09-06 ENCOUNTER — HOSPITAL ENCOUNTER (EMERGENCY)
Age: 55
Discharge: HOME | End: 2020-09-06
Attending: EMERGENCY MEDICINE
Payer: COMMERCIAL

## 2020-09-06 ENCOUNTER — APPOINTMENT (OUTPATIENT)
Dept: CT IMAGING | Age: 55
End: 2020-09-06
Attending: EMERGENCY MEDICINE
Payer: COMMERCIAL

## 2020-09-06 VITALS
RESPIRATION RATE: 15 BRPM | HEART RATE: 91 BPM | DIASTOLIC BLOOD PRESSURE: 81 MMHG | HEIGHT: 68 IN | TEMPERATURE: 98.9 F | WEIGHT: 252 LBS | SYSTOLIC BLOOD PRESSURE: 148 MMHG | BODY MASS INDEX: 38.19 KG/M2 | OXYGEN SATURATION: 96 %

## 2020-09-06 DIAGNOSIS — S16.1XXA STRAIN OF NECK MUSCLE, INITIAL ENCOUNTER: ICD-10-CM
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
## 2020-09-06 DIAGNOSIS — S62.337A CLOSED DISPLACED FRACTURE OF NECK OF FIFTH METACARPAL BONE OF LEFT HAND, INITIAL ENCOUNTER: ICD-10-CM

## 2020-09-06 DIAGNOSIS — E04.9 GOITER: ICD-10-CM

## 2020-09-06 PROCEDURE — 99283 EMERGENCY DEPT VISIT LOW MDM: CPT

## 2020-09-06 PROCEDURE — 73130 X-RAY EXAM OF HAND: CPT

## 2020-09-06 PROCEDURE — 74011250637 HC RX REV CODE- 250/637: Performed by: EMERGENCY MEDICINE

## 2020-09-06 PROCEDURE — 72125 CT NECK SPINE W/O DYE: CPT

## 2020-09-06 PROCEDURE — 75810000053 HC SPLINT APPLICATION

## 2020-09-06 PROCEDURE — 70450 CT HEAD/BRAIN W/O DYE: CPT

## 2020-09-06 RX ORDER — IBUPROFEN 600 MG/1
600 TABLET ORAL
Qty: 20 TAB | Refills: 0 | Status: SHIPPED | OUTPATIENT
Start: 2020-09-06

## 2020-09-06 RX ORDER — ACETAMINOPHEN 500 MG
1000 TABLET ORAL ONCE
Status: COMPLETED | OUTPATIENT
Start: 2020-09-06 | End: 2020-09-06

## 2020-09-06 RX ADMIN — ACETAMINOPHEN 1000 MG: 500 TABLET, FILM COATED ORAL at 14:52

## 2020-09-06 NOTE — LETTER
[address] [facility] EMERGENCY DEPT 
[address] 
[address] [facility] [address] [phone] Work/School Note Date: 9/6/2020 To Whom It May concern: 
 
Coni Olmos was seen and treated today in the emergency room by the following provider(s): 
Attending Provider: Heidi Salas MD. Coni Olmos may return to work on 9/8/2020. Sincerely, Yesenia Plaza MD
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
## 2020-09-06 NOTE — DISCHARGE INSTRUCTIONS
Patient Education        Neck Strain: Care Instructions  Your Care Instructions     You have strained the muscles and ligaments in your neck. A sudden, awkward movement can strain the neck. This often occurs with falls or car accidents or during certain sports. Everyday activities like working on a computer or sleeping can also cause neck strain if they force you to hold your neck in an awkward position for a long time. It is common for neck pain to get worse for a day or two after an injury, but it should start to feel better after that. You may have more pain and stiffness for several days before it gets better. This is expected. It may take a few weeks or longer for it to heal completely. Good home treatment can help you get better faster and avoid future neck problems. Follow-up care is a key part of your treatment and safety. Be sure to make and go to all appointments, and call your doctor if you are having problems. It's also a good idea to know your test results and keep a list of the medicines you take. How can you care for yourself at home? · If you were given a neck brace (cervical collar) to limit neck motion, wear it as instructed for as many days as your doctor tells you to. Do not wear it longer than you were told to. Wearing a brace for too long can make neck stiffness worse and weaken the neck muscles. · You can try using heat or ice to see if it helps. ? Try using a heating pad on a low or medium setting for 15 to 20 minutes every 2 to 3 hours. Try a warm shower in place of one session with the heating pad. You can also buy single-use heat wraps that last up to 8 hours. ? You can also try an ice pack for 10 to 15 minutes every 2 to 3 hours. · Take pain medicines exactly as directed. ? If the doctor gave you a prescription medicine for pain, take it as prescribed. ? If you are not taking a prescription pain medicine, ask your doctor if you can take an over-the-counter medicine.   · Gently rub the area to relieve pain and help with blood flow. Do not massage the area if it hurts to do so. · Do not do anything that makes the pain worse. Take it easy for a couple of days. You can do your usual activities if they do not hurt your neck or put it at risk for more stress or injury. · Try sleeping on a special neck pillow. Place it under your neck, not under your head. Placing a tightly rolled-up towel under your neck while you sleep will also work. If you use a neck pillow or rolled towel, do not use your regular pillow at the same time. · To prevent future neck pain, do exercises to stretch and strengthen your neck and back. Learn how to use good posture, safe lifting techniques, and proper body mechanics. When should you call for help? Call 911 anytime you think you may need emergency care. For example, call if:    · You are unable to move an arm or a leg at all. Call your doctor now or seek immediate medical care if:    · You have new or worse symptoms in your arms, legs, chest, belly, or buttocks. Symptoms may include:  ? Numbness or tingling. ? Weakness. ? Pain.     · You lose bladder or bowel control. Watch closely for changes in your health, and be sure to contact your doctor if:    · You are not getting better as expected. Where can you learn more? Go to http://debbie-abdiel.info/  Enter M253 in the search box to learn more about \"Neck Strain: Care Instructions. \"  Current as of: March 2, 2020               Content Version: 12.6  © 5849-8214 [employer]. Care instructions adapted under license by [employer] (which disclaims liability or warranty for this information). If you have questions about a medical condition or this instruction, always ask your healthcare professional. [address] any warranty or liability for your use of this information.

## 2020-09-24 ENCOUNTER — HOSPITAL ENCOUNTER (OUTPATIENT)
Dept: GENERAL RADIOLOGY | Age: 55
Discharge: HOME | End: 2020-09-24
Payer: COMMERCIAL
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
## 2020-09-24 DIAGNOSIS — M54.41 ACUTE MIDLINE LOW BACK PAIN WITH RIGHT-SIDED SCIATICA: ICD-10-CM

## 2020-09-24 PROCEDURE — 72110 X-RAY EXAM L-2 SPINE 4/>VWS: CPT

## 2022-02-28 NOTE — PROGRESS NOTES
HISTORY OF PRESENT ILLNESS Renetta Davey is a 52 y.o. male. Mr. Betsy Campbell returns today, at our request, for a refill of his BP medication. He has been taking Micardis HCT 80/25 for years. He has no complaints. I saw him 10 months ago and his kidney function tests came back abnormal. He was advised to recheck in a few months but we haven't seen him since. He has a 's license and says his BP is always well-controlled on this medication when he goes in for his yearly physicals. He also mentions on the way out that he is due for a colonoscopy. Review of Systems Constitutional: Negative for chills and fever. HENT: Negative for hearing loss and sore throat. Eyes: Negative for blurred vision and double vision. Respiratory: Negative for cough and shortness of breath. Cardiovascular: Negative for chest pain and palpitations. Gastrointestinal: Negative for abdominal pain, nausea and vomiting. Genitourinary: Negative for dysuria and urgency. Musculoskeletal: Negative for myalgias. Skin: Negative for rash. Neurological: Negative for headaches. Psychiatric/Behavioral: Negative for depression and suicidal ideas. Physical Exam  
Constitutional: He is oriented to person, place, and time. He appears well-developed and well-nourished. Eyes: Pupils are equal, round, and reactive to light. Neck: Neck supple. No thyromegaly present. Cardiovascular: Normal rate, regular rhythm and normal heart sounds. Pulmonary/Chest: Effort normal and breath sounds normal. No respiratory distress. He has no wheezes. He has no rales. Abdominal: Soft. He exhibits no distension. There is no tenderness. There is no rebound. Lymphadenopathy:  
  He has no cervical adenopathy. Neurological: He is alert and oriented to person, place, and time. Skin: No erythema. Psychiatric: He has a normal mood and affect. His behavior is normal.  
Nursing note and vitals reviewed. ASSESSMENT and PLAN 
  ICD-10-CM ICD-9-CM 1. Essential hypertension I10 401.9 telmisartan-hydroCHLOROthiazide (MICARDIS HCT) 80-25 mg per tablet METABOLIC PANEL, BASIC 2. Prediabetes L17.71 205.41 METABOLIC PANEL, BASIC 3. Abnormal kidney function N28.9 593.9 4. Vitamin D deficiency E55.9 268.9 VITAMIN D, 25 HYDROXY 5. Screening for colon cancer Z12.11 V76.51 REFERRAL TO GASTROENTEROLOGY Forgot to order A1C, will get one in 3 months when he returns. Patent